# Patient Record
(demographics unavailable — no encounter records)

---

## 2024-12-27 NOTE — HISTORY OF PRESENT ILLNESS
[de-identified] : WC -8/1/2022 Patient is here for re-evaluation of injuries sustained at work, at the time of the injury she was evaluated for several body part injuries, today she is here for her neck.  Patient hasn't returned back to work.  Patient state that she has constant chronic pain to her cervical spine. Patient has used oral, topical, and lifestyle modifications for her neck w/o improvement, she states that PT for the neck hasn't been approved. Patient states that she improved with the oral prednisone, but at this moment her neck is hurting her again. Difficult and pain with ROM,

## 2024-12-27 NOTE — IMAGING
[de-identified] : MRI cervical spine ( 3/7/2023) findings: Cervical disc herniation to C5-6, cervical disc bulging to c2-3,c3-4,c4-5,c6-7 amended  4/3/23

## 2024-12-27 NOTE — ASSESSMENT
[FreeTextEntry1] : Additional therapy for the neck or pain injections were denied she remains totally disabled unable to work I will see her back in 3 months at this point based on the stated history and current symptoms and physical examination I do believe that the cervical spine injury is part of the initial work accident At this time I do believe the injury sustained to the left knee is amenable to a schedule loss use for the left knee  15 % The injury to her cervical spine can be classified   Class 3     Severity  B All measurements were taken 3 times with the use of a goniometer

## 2024-12-27 NOTE — IMAGING
[de-identified] : MRI cervical spine ( 3/7/2023) findings: Cervical disc herniation to C5-6, cervical disc bulging to c2-3,c3-4,c4-5,c6-7 amended  4/3/23

## 2024-12-27 NOTE — PHYSICAL EXAM
[4___] : left grasp 4[unfilled]/5 [5___] : right grasp 5[unfilled]/5 [] : positive facet loading [TWNoteComboBox7] : forward flexion 20 degrees [de-identified] : extension 5 degrees [de-identified] : left lateral rotation 5 degrees [TWNoteComboBox6] : right lateral rotation 25 degrees [FreeTextEntry3] : Antalgic gait to the left  ROM left -5 degrees full extension 120 degrees flexion right knee ROM full extension   140 degrees flexion

## 2024-12-27 NOTE — HISTORY OF PRESENT ILLNESS
[de-identified] : WC -8/1/2022 Patient is here for re-evaluation of injuries sustained at work, at the time of the injury she was evaluated for several body part injuries, today she is here for her neck.  Patient hasn't returned back to work.  Patient state that she has constant chronic pain to her cervical spine. Patient has used oral, topical, and lifestyle modifications for her neck w/o improvement, she states that PT for the neck hasn't been approved. Patient states that she improved with the oral prednisone, but at this moment her neck is hurting her again. Difficult and pain with ROM,

## 2024-12-27 NOTE — REASON FOR VISIT
[FreeTextEntry2] : Patient is a 44-year-old woman here for re-evaluation of work injuries 8/1/22 of her neck and left knee L knee scope 11/2/22 Return to work for a few months but has been out of work since 7/26/2023 describes the pain in her left knee is horrible limps difficulty with stairs had MRI cervical spine 4/3/2023 not seeing pain management at present request for cervical injections was denied

## 2024-12-27 NOTE — PHYSICAL EXAM
[4___] : left grasp 4[unfilled]/5 [5___] : right grasp 5[unfilled]/5 [] : positive facet loading [TWNoteComboBox7] : forward flexion 20 degrees [de-identified] : extension 5 degrees [de-identified] : left lateral rotation 5 degrees [TWNoteComboBox6] : right lateral rotation 25 degrees [FreeTextEntry3] : Antalgic gait to the left  ROM left -5 degrees full extension 120 degrees flexion right knee ROM full extension   140 degrees flexion

## 2025-02-20 NOTE — HISTORY OF PRESENT ILLNESS
[FreeTextEntry1] : I have the pleasure to see Ms Pena today for cardiology follow up. 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 Last visit was evaluated for  intermittent palpitations and visited ED on 3/2024 basic workup including EKG, CE were unremarkable. Patient has been on more stress at that time echo done showed normal EF unremarkable 4/2024 MCOT 30 days no major events PAC's < 1%  BP normal  12/25/2024 HDL 58  TG 90 HBA1C 5.9  2/20/2025 currently stable  patient is stable feels better no active symptoms  EKG showed sinus rhythm no ischemic changes non smoker, no alcohol no illicit drugs.

## 2025-02-20 NOTE — REVIEW OF SYSTEMS
[Negative] : Gastrointestinal [Fever] : no fever [Chills] : no chills [SOB] : no shortness of breath [Dyspnea on exertion] : not dyspnea during exertion [Chest Discomfort] : no chest discomfort [Lower Ext Edema] : no extremity edema [Palpitations] : no palpitations [Syncope] : no syncope [Convulsions] : no convulsions [Confusion] : no confusion was observed

## 2025-02-20 NOTE — PHYSICAL EXAM
[No Acute Distress] : no acute distress [Normal] : no rash, no skin lesions [Alert and Oriented] : alert and oriented

## 2025-02-20 NOTE — ASSESSMENT
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 currently stable, sinus rhythm on EKG  # Palpitations: currently feeling better with lifestyle modification echo normal EF unremarkable  MCOT 30 days no major events PAC/s < 1% patient advised to continue  avoid all caffeine/energetic products, keep hydrated, goos sleep pattern, and avoid stress as much as possible   #HTN controlled  continue currently medications  #DL patient was not fasting when test was done plan to be repeated, patient already have script from [facility]  discussed heart healthy diet ,exercise and weight loss   Follow up 6m

## 2025-03-26 NOTE — HISTORY OF PRESENT ILLNESS
[de-identified] : WC -8/1/2022 Patient is here for re-evaluation of injuries sustained at work, at the time of the injury she was evaluated for several body part injuries, today she is here for her neck.  Patient hasn't returned back to work.  Patient state that she has constant chronic pain to her cervical spine. Patient has used oral, topical, and lifestyle modifications for her neck w/o improvement, she states that PT for the neck hasn't been approved. Patient states that she improved with the oral prednisone, but at this moment her neck is hurting her again. Difficult and pain with ROM,

## 2025-03-26 NOTE — PHYSICAL EXAM
[4___] : left grasp 4[unfilled]/5 [5___] : right grasp 5[unfilled]/5 [] : positive facet loading [TWNoteComboBox7] : forward flexion 20 degrees [de-identified] : extension 5 degrees [de-identified] : left lateral rotation 5 degrees [TWNoteComboBox6] : right lateral rotation 25 degrees [FreeTextEntry3] : Antalgic gait to the left  ROM left -5 degrees full extension 120 degrees flexion right knee ROM full extension   140 degrees flexion

## 2025-03-26 NOTE — IMAGING
[de-identified] : MRI cervical spine ( 3/7/2023) findings: Cervical disc herniation to C5-6, cervical disc bulging to c2-3,c3-4,c4-5,c6-7 amended  4/3/23

## 2025-03-26 NOTE — PHYSICAL EXAM
[4___] : left grasp 4[unfilled]/5 [5___] : right grasp 5[unfilled]/5 [] : positive facet loading [TWNoteComboBox7] : forward flexion 20 degrees [de-identified] : extension 5 degrees [de-identified] : left lateral rotation 5 degrees [TWNoteComboBox6] : right lateral rotation 25 degrees [FreeTextEntry3] : Antalgic gait to the left  ROM left -5 degrees full extension 120 degrees flexion right knee ROM full extension   140 degrees flexion

## 2025-03-26 NOTE — REASON FOR VISIT
[FreeTextEntry2] : Patient is a 44-year-old woman here for re-evaluation of work injuries 8/1/22 of her neck and left knee He feels a little worse right now Still out of work

## 2025-03-26 NOTE — IMAGING
[de-identified] : MRI cervical spine ( 3/7/2023) findings: Cervical disc herniation to C5-6, cervical disc bulging to c2-3,c3-4,c4-5,c6-7 amended  4/3/23

## 2025-03-26 NOTE — HISTORY OF PRESENT ILLNESS
[de-identified] : WC -8/1/2022 Patient is here for re-evaluation of injuries sustained at work, at the time of the injury she was evaluated for several body part injuries, today she is here for her neck.  Patient hasn't returned back to work.  Patient state that she has constant chronic pain to her cervical spine. Patient has used oral, topical, and lifestyle modifications for her neck w/o improvement, she states that PT for the neck hasn't been approved. Patient states that she improved with the oral prednisone, but at this moment her neck is hurting her again. Difficult and pain with ROM,

## 2025-03-26 NOTE — ASSESSMENT
[FreeTextEntry1] : Additional therapy for the neck or pain injections were denied she remains totally disabled unable to work I will see her back in 3 months at this point based on the stated history and current symptoms and physical examination I do believe that the cervical spine injury is part of the initial work accident  the option of a  cortisone injection in the  left  knee was discussed with the patient today.  risks and benefits were reviewed and  consent was given.  with sterile technique  through a lateral approach 5 cc dexamethasone (20 mg) and 5 cc 1%  lidocaine was infiltrated with good effect and a  Band-Aid applied ice was  recommended She has reached maximal medical improvement she is unable to work

## 2025-06-27 NOTE — IMAGING
[de-identified] : MRI cervical spine ( 3/7/2023) findings: Cervical disc herniation to C5-6, cervical disc bulging to c2-3,c3-4,c4-5,c6-7 amended  4/3/23

## 2025-06-27 NOTE — HISTORY OF PRESENT ILLNESS
[de-identified] : WC -8/1/2022 Patient is here for re-evaluation of injuries sustained at work, at the time of the injury she was evaluated for several body part injuries, today she is here for her neck.  Patient hasn't returned back to work.  Patient state that she has constant chronic pain to her cervical spine. Patient has used oral, topical, and lifestyle modifications for her neck w/o improvement, she states that PT for the neck hasn't been approved. Patient states that she improved with the oral prednisone, but at this moment her neck is hurting her again. Difficult and pain with ROM,

## 2025-06-27 NOTE — REASON FOR VISIT
[FreeTextEntry2] : Patient is a 44-year-old woman here for re-evaluation of work injuries 8/1/22 of her neck and left knee She feels a little worse right now Still out of work Cortisone injection in the helped a little bit she still not working she went to urgent center for some back pain recently and had Naprosyn took it but it started to bother her stomach

## 2025-06-27 NOTE — PHYSICAL EXAM
[4___] : left grasp 4[unfilled]/5 [5___] : right grasp 5[unfilled]/5 [] : positive facet loading [TWNoteComboBox7] : forward flexion 20 degrees [de-identified] : extension 5 degrees [de-identified] : left lateral rotation 5 degrees [TWNoteComboBox6] : right lateral rotation 25 degrees [FreeTextEntry3] : Antalgic gait to the left  ROM left -5 degrees full extension 120 degrees flexion right knee ROM full extension   140 degrees flexion

## 2025-06-27 NOTE — ASSESSMENT
[FreeTextEntry1] : Additional therapy for the neck or pain injections were denied she remains totally disabled unable to work I will see her back in 3 months at this point based on the stated history and current symptoms and physical examination I do believe that the cervical spine injury is part of the initial work accident  May consider repeating the cortisone injection in her knee She tells me she is having an MILAN exam later in July She has reached maximal medical improvement she is unable to work